# Patient Record
Sex: MALE | Race: WHITE
[De-identification: names, ages, dates, MRNs, and addresses within clinical notes are randomized per-mention and may not be internally consistent; named-entity substitution may affect disease eponyms.]

---

## 2020-12-16 ENCOUNTER — HOSPITAL ENCOUNTER (OUTPATIENT)
Dept: HOSPITAL 7 - FB.SDS | Age: 65
Discharge: HOME | End: 2020-12-16
Attending: SURGERY
Payer: COMMERCIAL

## 2020-12-16 DIAGNOSIS — E78.5: ICD-10-CM

## 2020-12-16 DIAGNOSIS — E11.9: ICD-10-CM

## 2020-12-16 DIAGNOSIS — Z88.0: ICD-10-CM

## 2020-12-16 DIAGNOSIS — Z98.890: ICD-10-CM

## 2020-12-16 DIAGNOSIS — Z79.899: ICD-10-CM

## 2020-12-16 DIAGNOSIS — Z12.11: Primary | ICD-10-CM

## 2020-12-16 NOTE — PCM.OPNOTE
- General Post-Op/Procedure Note


Date of Surgery/Procedure: 12/16/20


Operative Procedure(s): c scope


Findings: 





nl exam 


Pre Op Diagnosis: screening for colon cancer


Post-Op Diagnosis: Same


Anesthesia Technique: MAC


Primary Surgeon: Moody Johns


Anesthesia Provider: Jose Miguel Constantino


Pathology: 





none





Complications: None


Condition: Good


Free Text/Narrative:: 


see dictation [id number]

## 2020-12-16 NOTE — OR
DATE OF OPERATION:  12/16/2020

 

SURGEON:  Moody Johns MD

 

PROCEDURE PERFORMED:  Colonoscopy.

 

PREOPERATIVE DIAGNOSIS:  Need for colon cancer screening.

 

POSTOPERATIVE DIAGNOSIS:  Normal exam.

 

INDICATIONS FOR PROCEDURE:  This is a 65-year-old white male who is referred for

routine colon cancer screening.  He is asymptomatic and without complaints.

 

DESCRIPTION OF OPERATION:  After an excellent IV sedation was administered,

digital rectal exam was performed.  No marked abnormality was noted.  Flexible

colonoscope was inserted and advanced to the cecum.  Prep was excellent.  The

following findings were noted:  Ascending colon unremarkable.  Transverse colon

unremarkable.  Descending colon unremarkable.  Sigmoid and rectum unremarkable.

Colon was deflated.  Scope was removed.  The patient tolerated the procedure

well.

 

RECOMMENDATIONS:  Repeat colonoscopy in 10 years.

 

Job#: [id number]

DD: 12/16/2020 0932

DT: 12/16/2020 1039 AS/MODL